# Patient Record
Sex: MALE | Race: WHITE | NOT HISPANIC OR LATINO | Employment: FULL TIME | URBAN - METROPOLITAN AREA
[De-identification: names, ages, dates, MRNs, and addresses within clinical notes are randomized per-mention and may not be internally consistent; named-entity substitution may affect disease eponyms.]

---

## 2022-03-31 ENCOUNTER — EVALUATION (OUTPATIENT)
Dept: PHYSICAL THERAPY | Facility: REHABILITATION | Age: 57
End: 2022-03-31
Payer: COMMERCIAL

## 2022-03-31 DIAGNOSIS — R26.89 IMBALANCE: ICD-10-CM

## 2022-03-31 DIAGNOSIS — I63.9 CEREBELLAR STROKE (HCC): Primary | ICD-10-CM

## 2022-03-31 PROCEDURE — 97112 NEUROMUSCULAR REEDUCATION: CPT | Performed by: PHYSICAL THERAPIST

## 2022-03-31 PROCEDURE — 97162 PT EVAL MOD COMPLEX 30 MIN: CPT | Performed by: PHYSICAL THERAPIST

## 2022-03-31 RX ORDER — ASPIRIN 325 MG
325 TABLET ORAL DAILY
COMMUNITY

## 2022-03-31 RX ORDER — ATORVASTATIN CALCIUM 10 MG/1
10 TABLET, FILM COATED ORAL DAILY
COMMUNITY

## 2022-03-31 NOTE — PROGRESS NOTES
PT Evaluation     Today's date: 3/31/2022  Patient name: Nessa Cruz  : 1965  MRN: [id number]  Referring provider: Kathryn Alfaro MD  Dx:   Encounter Diagnosis     ICD-10-CM    1  Cerebellar stroke (HCC)  I63 9    2  Imbalance  R26 89                   Assessment  Assessment details: Pt is a 61year old male referred after a cerebellar stroke  Pt presented today with impairments in decreased endurance, decreased standing balance, decreased dynamic balance all which limit him functionally with safe return to work  Pt will benefit from PT services needed to address above impairments needed to safely return to work  Impairments: abnormal gait, activity intolerance, impaired balance and lacks appropriate home exercise program  Understanding of Dx/Px/POC: good   Prognosis: good    Goals  STG  1  Pt will improve gait speed to 1 5 m/s within 2 weeks  2  Pt will improve FGA to at least 27/30 within 2 weeks  3  Pt will demonstrate independence with HEP within 2 weeks  LTGs  1  Pt will improve 6MWT to at least 1800' within 4 weeks  2  Pt will improve FGA to 30/30 within 4 weeks needed for safe return to work  3  Pt will maintain balance on noncompliant surfaces for 30s within 4 weeks needed for safe return to work  4  Pt will demonstrate independence with HEP within 4 weeks  Plan  Patient would benefit from: skilled physical therapy  Planned therapy interventions: balance, neuromuscular re-education, patient education, gait training, therapeutic exercise, therapeutic training and home exercise program  Frequency: 2x week  Duration in weeks: 4  Plan of Care beginning date: 3/31/2022  Plan of Care expiration date: 2022  Treatment plan discussed with: patient        Subjective Evaluation    History of Present Illness  Mechanism of injury: On 3/19 presented with complaints of gait ataxia, vertigo, and nausea/vomiting since Thursday  CT head revealed left cerebellar stroke     MRI demonstrates left cerebellar infarct and small right occipital stroke    Main issue now is his balance, but it is getting better  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  No home care  Needs to have good balance  Does get dizzy when rolling over in bed  Denies falls  Pain  No pain reported    Social Support  Steps to enter house: yes  Stairs in house: yes   Lives in: multiple-level home  Lives with: spouse    Employment status: working    Diagnostic Tests  MRI studies: abnormal  CT scan: abnormal  Patient Goals  Patient goals for therapy: improved balance and return to work          Objective   Neuro Exam:     Functional outcomes   6 minute walk test: 1500' without AD  Gait speed: 1 2 m/s without AD  5x sit to stand: 8 sec (seconds)  Functional outcome comment: Modified Clinical Test of Sensory Interaction on Balance  30 eyes open firm surface  30 eyes closed firm surface  30 eyes open foam surface  7 eyes closed foam surface    FGA: 24/30    VOMS (Vestibular/Ocular Motor Screen)     Smooth pursuit Normal     Saccades (horizontal) Normal     Saccades (vertical)  Normal     Convergence (near point)--Normal     VOR (horizontal) Normal     VOR (vertical) Normal     Visual Motion Sensitivity Normal    Sharp-Juan: negative  Vertebral Artery Screen: negative  Cervical AROM: WNL  DixHallpike: Right: negative; Left: negative  (*did provoke symptoms both left and right, quick down beating nystagmus noted on both sides, however transient)  Roll Test: Right negative;  Left negative       Precautions: cerebellar stroke, holter monitor      Manuals 3/31                                                                Neuro Re-Ed             BPPV/vestibular education purpose of vestibular system, explanation of BPPV, signs and symptoms of central versus peripheral x15 min            Walking with HTs, H/V Discussed for HEP            EC balance Discussed for HEP Ther Ex                                                                                                                     Ther Activity                                       Gait Training                                       Modalities

## 2022-03-31 NOTE — LETTER
2022    Elizabeth Kuhn MD  [address]  [address]    Patient: Genet Tsai   YOB: 1965   Date of Visit: 3/31/2022     Encounter Diagnosis     ICD-10-CM    1  Cerebellar stroke (HCC)  I63 9    2  Imbalance  R26 89        Dear Dr Tobias Misael:    Thank you for your recent referral of Genet Tsai  Please review the attached evaluation summary from Dominic's recent visit  Please verify that you agree with the plan of care by signing the attached order  If you have any questions or concerns, please do not hesitate to call  I sincerely appreciate the opportunity to share in the care of one of your patients and hope to have another opportunity to work with you in the near future  Sincerely,    Savannah Díaz, PT      Referring Provider:      I certify that I have read the below Plan of Care and certify the need for these services furnished under this plan of treatment while under my care  Elizabeth Kuhn MD  [address]  [place] [address]  Via Fax: [phone]          PT Evaluation     Today's date: 3/31/2022  Patient name: Genet Tsai  : 1965  MRN: [id number]  Referring provider: Mckenna Paris MD  Dx:   Encounter Diagnosis     ICD-10-CM    1  Cerebellar stroke (HCC)  I63 9    2  Imbalance  R26 89                   Assessment  Assessment details: Pt is a 61year old male referred after a cerebellar stroke  Pt presented today with impairments in decreased endurance, decreased standing balance, decreased dynamic balance all which limit him functionally with safe return to work  Pt will benefit from PT services needed to address above impairments needed to safely return to work  Impairments: abnormal gait, activity intolerance, impaired balance and lacks appropriate home exercise program  Understanding of Dx/Px/POC: good   Prognosis: good    Goals  STG  1   Pt will improve gait speed to 1 5 m/s within 2 weeks  2  Pt will improve FGA to at least 27/30 within 2 weeks  3  Pt will demonstrate independence with HEP within 2 weeks  LTGs  1  Pt will improve 6MWT to at least 1800' within 4 weeks  2  Pt will improve FGA to 30/30 within 4 weeks needed for safe return to work  3  Pt will maintain balance on noncompliant surfaces for 30s within 4 weeks needed for safe return to work  4  Pt will demonstrate independence with HEP within 4 weeks  Plan  Patient would benefit from: skilled physical therapy  Planned therapy interventions: balance, neuromuscular re-education, patient education, gait training, therapeutic exercise, therapeutic training and home exercise program  Frequency: 2x week  Duration in weeks: 4  Plan of Care beginning date: 3/31/2022  Plan of Care expiration date: 4/29/2022  Treatment plan discussed with: patient        Subjective Evaluation    History of Present Illness  Mechanism of injury: On 3/19 presented with complaints of gait ataxia, vertigo, and nausea/vomiting since Thursday  CT head revealed left cerebellar stroke  MRI demonstrates left cerebellar infarct and small right occipital stroke    Main issue now is his balance, but it is getting better  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[employer], has 4 weeks off of work  No home care  Needs to have good balance  Does get dizzy when rolling over in bed  Denies falls    Pain  No pain reported    Social Support  Steps to enter house: yes  Stairs in house: yes   Lives in: multiple-level home  Lives with: spouse    Employment status: working    Diagnostic Tests  MRI studies: abnormal  CT scan: abnormal  Patient Goals  Patient goals for therapy: improved balance and return to work          Objective   Neuro Exam:     Functional outcomes   6 minute walk test: 1500' without AD  Gait speed: 1 2 m/s without AD  5x sit to stand: 8 sec (seconds)  Functional outcome comment: Modified Clinical Test of Sensory Interaction on Balance  30 eyes open firm surface  30 eyes closed firm surface  30 eyes open foam surface  7 eyes closed foam surface    FGA: 24/30    VOMS (Vestibular/Ocular Motor Screen)     Smooth pursuit Normal     Saccades (horizontal) Normal     Saccades (vertical)  Normal     Convergence (near point)--Normal     VOR (horizontal) Normal     VOR (vertical) Normal     Visual Motion Sensitivity Normal    Sharp-Juan: negative  Vertebral Artery Screen: negative  Cervical AROM: WNL  DixHallpike: Right: negative; Left: negative  (*did provoke symptoms both left and right, quick down beating nystagmus noted on both sides, however transient)  Roll Test: Right negative;  Left negative       Precautions: cerebellar stroke, holter monitor      Manuals 3/31                                                                Neuro Re-Ed             BPPV/vestibular education purpose of vestibular system, explanation of BPPV, signs and symptoms of central versus peripheral x15 min            Walking with HTs, H/V Discussed for HEP            EC balance Discussed for HEP                                                                Ther Ex                                                                                                                     Ther Activity                                       Gait Training                                       Modalities

## 2022-04-04 ENCOUNTER — APPOINTMENT (OUTPATIENT)
Dept: PHYSICAL THERAPY | Facility: REHABILITATION | Age: 57
End: 2022-04-04
Payer: COMMERCIAL

## 2022-04-07 ENCOUNTER — OFFICE VISIT (OUTPATIENT)
Dept: PHYSICAL THERAPY | Facility: REHABILITATION | Age: 57
End: 2022-04-07
Payer: COMMERCIAL

## 2022-04-07 ENCOUNTER — EVALUATION (OUTPATIENT)
Dept: OCCUPATIONAL THERAPY | Facility: REHABILITATION | Age: 57
End: 2022-04-07
Payer: COMMERCIAL

## 2022-04-07 DIAGNOSIS — R26.89 IMBALANCE: Primary | ICD-10-CM

## 2022-04-07 DIAGNOSIS — I63.9 CEREBELLAR STROKE (HCC): ICD-10-CM

## 2022-04-07 DIAGNOSIS — I63.9 CEREBELLAR STROKE (HCC): Primary | ICD-10-CM

## 2022-04-07 PROCEDURE — 97116 GAIT TRAINING THERAPY: CPT | Performed by: PHYSICAL THERAPIST

## 2022-04-07 PROCEDURE — 97112 NEUROMUSCULAR REEDUCATION: CPT | Performed by: PHYSICAL THERAPIST

## 2022-04-07 PROCEDURE — 97530 THERAPEUTIC ACTIVITIES: CPT | Performed by: OCCUPATIONAL THERAPIST

## 2022-04-07 PROCEDURE — 97165 OT EVAL LOW COMPLEX 30 MIN: CPT | Performed by: OCCUPATIONAL THERAPIST

## 2022-04-07 NOTE — PROGRESS NOTES
Daily Note     Today's date: 2022  Patient name: Altaf Pollock  : 1965  MRN: [id number]  Referring provider: Tre Chau MD  Dx:   Encounter Diagnosis     ICD-10-CM    1  Imbalance  R26 89    2  Cerebellar stroke ([facility] [address] )  I63 9                   Subjective: Received from OT eval   Does not need OT services  Dizziness is better  Objective: See treatment diary below      Assessment: Initiated POC addressing balance deficits found on eval   Tolerated treatment well as seen by no complaints of pain and no rest periods required  Most difficulty with EC on foam and VORcx on TM due balance deficits  Patient would benefit from continued PT      Plan: Add incline to TM to challenge intensity       Precautions: cerebellar stroke, holter monitor      Manuals 3/31 4/7                                                               Neuro Re-Ed             BPPV/vestibular education purpose of vestibular system, explanation of BPPV, signs and symptoms of central versus peripheral x15 min            Walking with HTs, H/V Discussed for HEP On TM; bwd with SOLO 4x30' ea           EC balance Discussed for HEP Foam semi 4x30s; with HTs FTEC 2x30s ea           Foam beams  Tandem EC 4 laps; over high hurdles 4 laps ea fwd/side           Step-ups with holds  8"& foam fwd/side x20 ea           Slip   x5min           Tandem gait EC  SOLO 4x30'           Ther Ex                                                                                                                     Ther Activity                                       Gait Training             Treadmill  limited UE 2 5 mph x10 min with HTs H/V 2x30s ea                        Modalities

## 2022-04-07 NOTE — PROGRESS NOTES
OCCUPATIONAL THERAPY INITIAL EVALUATION:    04/07/2022  Dominic Lindsey  1965  [id number]  Cory Olivo MD   Diagnosis ICD-10-CM Associated Orders   1  Cerebellar stroke [facility])  I63 9          Assessment  Assessment details: See impairment section for further details  Skilled Analysis:  Pt is a 61 y o  male referred to Occupational Therapy s/p Cerebellar stroke ([facility] [address] ) [I63 9]  Pt participated in skilled OT evaluation and following formalized testing as well as clinical observation, Pt presents with the following areas of deficit:  Ongoing slight imbalance though Pt demo with improvement noted weekly  OTR performed full motor evaluation with results all in [facility] and performed vision screen without identification of any visual insufficiencies outside of baseline functioning  Pt with PMH of astigmatism 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, though baseline since childhood  OTR educated PT on vision screen results with all questions answered  Pt continues to demo with excellent functional recovery as a whole  OTR will not be recommending further OT services, though continuing to recommend  skilled PT services to further address slight imbalance deficits  Short Term Goals:  - No skilled OT tx recommended at this time  Long Term Goals:  - No skilled OT tx recommended at this time  Subjective Evaluation    Quality of life: good          SUBJECTIVE: "I feel pretty good  Just my balance is a little off "    PATIENT GOAL: "Balance to get better "      HISTORY OF PRESENT ILLNESS:     Pt is a 61 y o  male who was referred to Occupational Therapy s/p  [facility] [I63 9]    As per hospital reports, Pt with past medical history of hyperlipidemia who presented on 03/19/2022 with complaints of gait ataxia, vertigo, and nausea/vomiting since Thursday   CT head revealed left cerebellar stroke   CTA showed 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 with intraluminal thrombus in the distal V2, the V3 and V4 segments of the left vertebral artery  The underlying etiology is likely a dissection   MRI demonstrates left cerebellar infarct and small right occipital stroke   CT head this morning shows evolving left inferior/medial cerebellar infarct with stable mass effect on the inferior fourth ventricle   There was no hydrocephalus  MRI brain on 3/19 showed left cerebellar and vermian infarct and small infarct within the right occipital lobe with significant mass effect on the fourth ventricle but no hemorrhage and decreased flow signal in the distal left vertebral artery  Serial CTH were stable, checked daily for three days  Patient was approved for outpatient rehab and home with assist per PT  Cardiology was consulted due to consistent bradycardia, which patient reports has always been something he has had  Cardiology does not believe this is pathologic but recommended avoidance of AV blocking medications and cardiac monitoring as outpatient for Atrial Fibrillation  Pt was D/Jaswinder to home environment on 2022 with further recommendations of PT/OT services  Pt has f/u appt with cardiologist and neurologist scheduled  Pt with self-report of continuing to notice slight delayed accommodation with self-report of noticing increased time to focus when performing near/far functional saccadic movements  Pt reports continuing to notice slight decreased balance  Pt lives in a two story home wife  Pt currently performing all ADLs/IADLs at independent status without difficulties reported  Pt loss of worker role until potentially 2022, but will be further discussed/assessed  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  Pt continue the  role without difficulties reported        PMH:   Past Medical History:   Diagnosis Date    Hyperlipidemia          Pain Levels:     Restin    With Activity:  0    Objective     Functional Assessment        Comments  See impairment section for further details  Impairment Observations:        IE RUE IE LUE  Comments                 UPPER EXTREMITY FXN    Pt is L hand dominant                           /Pinch Strength           Dynamometer       - Gross Grasp 140 lbs 145 lbs  within normal limits   Pinch Meter        - PINCER 19 lbs 21 lbs  within normal limits    - TRIPOD 36 lbs 36 5 lbs  within normal limits    - LATERAL 33 5 lbs  30 lbs  within normal limits               AROM (seated)        [facility]     [facility]  WFL      Shoulder Ext [facility]  WFL      Shoulder Abd [facility]  WFL      Shoulder Add WFL  WFL      Horizontal Abd WFL  WFL      Horizontal Add WFL  WFL      Elbow Flex WFL  WFL      Elbow Ext WFL  WFL      Pronation WFL  WFL      Supination [facility]  WFL      Wrist Flex WFL  WFL      Wrist Ext [facility]  WFL      Digit Flex [facility]  WFL      Digit Ex [facility]  WFL      Composite Grasp WFL  WFL      Hook Grasp [facility]  WFL      Opposition Intact Intact      Dysdiadochokinesia Intact  Intact     MMT:       Shoulder FF 5/5 5/5     Shoulder Ext 5/5 5/5     Shoulder Abd 5/5 5/5     Shoulder ADd 5/5 5/5     Elbow Flex 5/5 5/5     Elbow Ext 5/5 5/5     Wrist Flex 5/5 5/5     Wrist Ext 5/5 5/5     SENSATION       Myofilaments (2 83Wnl) 2 83 2 83  Loss of protective sensation: 4 56    Complete loss of sensation / deep pressure: 6 65   Sharp Dull  Intact Intact     Proprioception Intact Intact     Hot/Cold Temp Intact Intact            COORDINATION       9 Hole Peg Test 23 66 seconds 20 04 seconds  within normal limits   Fxnl Dexterity Test 25 53 seconds 24 47 seconds  within normal limits     Vision Screen    Pt with PMH of astigmatismstigmatismin B/L eyes;  Pt with prescribed glasses (no-line bifocals)    Visual acuity R 20/20, L 20/50    Binocularity, far orthotropia/orthophoria    Binocularity, near orthotropia/orthophoria    Accommodation 2"    Convergence Decreased teaming in L eye, Pt with past history of L eye insufficiencies (astigmatism)    Ruddy string misalignment    Pursuits Smooth in all planes    Saccades Initially slight dysmetria in vertical plane, though resolved with massed practice    ROM Full AROM    Visual perceptual midline test Intact        OTHER PLANNED THERAPY INTERVENTIONS:   No skilled OT tx recommended at this time        INTERVENTION COMMENTS:  Diagnosis: Cerebellar stroke [facility]) [I63 9]  Precautions: Fall risk  FOTO: not applicable  Insurance: Payor: [employer] / Plan: [employer] / Product Type: PPO Commercial /

## 2022-04-07 NOTE — LETTER
April 7, 2022    Travon Cruz MD  [address]  [facility]  [address] [facility]    Patient: Sheldon Gilbert   YOB: 1965   Date of Visit: 4/7/2022     Encounter Diagnosis     ICD-10-CM    1  Cerebellar stroke [facility])  I63 9        Dear Dr Quezada Range:    Thank you for your recent referral of Sheldon Gilbert  Please review the attached evaluation summary from Dominic's recent visit  Please verify that you agree with the plan of care by signing the attached order  If you have any questions or concerns, please do not hesitate to call  I sincerely appreciate the opportunity to share in the care of one of your patients and hope to have another opportunity to work with you in the near future  Sincerely,    Alysa Dubon, OT      Referring Provider:     I certify that I have read the below Plan of Care and certify the need for these services furnished under this plan of treatment while under my care  Travon Cruz MD  [address]  [place] [address]  Via Fax: [phone]          OCCUPATIONAL THERAPY INITIAL EVALUATION:    04/07/2022  Maurilioangeline Higginsn  1965  [id number]  Esthela Posadas MD   Diagnosis ICD-10-CM Associated Orders   1  Cerebellar stroke [facility])  I63 9          Assessment  Assessment details: See impairment section for further details  Skilled Analysis:  Pt is a 61 y o  male referred to Occupational Therapy s/p Cerebellar stroke ([facility] [address] ) [I63 9]  Pt participated in skilled OT evaluation and following formalized testing as well as clinical observation, Pt presents with the following areas of deficit:  Ongoing slight imbalance though Pt demo with improvement noted weekly  OTR performed full motor evaluation with results all in [facility] and performed vision screen without identification of any visual insufficiencies outside of baseline functioning    Pt with PMH of astigmatism 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, though baseline since childhood  OTR educated PT on vision screen results with all questions answered  Pt continues to demo with excellent functional recovery as a whole  OTR will not be recommending further OT services, though continuing to recommend  skilled PT services to further address slight imbalance deficits  Short Term Goals:  - No skilled OT tx recommended at this time  Long Term Goals:  - No skilled OT tx recommended at this time  Subjective Evaluation    Quality of life: good          SUBJECTIVE: "I feel pretty good  Just my balance is a little off "    PATIENT GOAL: "Balance to get better "      HISTORY OF PRESENT ILLNESS:     Pt is a 61 y o  male who was referred to Occupational Therapy s/p  [facility] [I63 9]  As per hospital reports, Pt with past medical history of hyperlipidemia who presented on 03/19/2022 with complaints of gait ataxia, vertigo, and nausea/vomiting since Thursday   CT head revealed left cerebellar stroke   CTA showed 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 with intraluminal thrombus in the distal V2, the V3 and V4 segments of the left vertebral artery  The underlying etiology is likely a dissection   MRI demonstrates left cerebellar infarct and small right occipital stroke   CT head this morning shows evolving left inferior/medial cerebellar infarct with stable mass effect on the inferior fourth ventricle   There was no hydrocephalus  MRI brain on 3/19 showed left cerebellar and vermian infarct and small infarct within the right occipital lobe with significant mass effect on the fourth ventricle but no hemorrhage and decreased flow signal in the distal left vertebral artery  Serial CTH were stable, checked daily for three days  Patient was approved for outpatient rehab and home with assist per PT  Cardiology was consulted due to consistent bradycardia, which patient reports has always been something he has had   Cardiology does not believe this is pathologic but recommended avoidance of AV blocking medications and cardiac monitoring as outpatient for Atrial Fibrillation  Pt was D/Jaswinder to home environment on 2022 with further recommendations of PT/OT services  Pt has f/u appt with cardiologist and neurologist scheduled  Pt with self-report of continuing to notice slight delayed accommodation with self-report of noticing increased time to focus when performing near/far functional saccadic movements  Pt reports continuing to notice slight decreased balance  Pt lives in a two story home wife  Pt currently performing all ADLs/IADLs at independent status without difficulties reported  Pt loss of worker role until potentially 2022, but will be further discussed/assessed  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 works 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/week  Pt continue the  role without difficulties reported  PMH:   Past Medical History:   Diagnosis Date    Hyperlipidemia          Pain Levels:     Restin    With Activity:  0    Objective     Functional Assessment        Comments  See impairment section for further details         Impairment Observations:        IE RUE IE LUE  Comments                 UPPER EXTREMITY FXN    Pt is L hand dominant                           /Pinch Strength           Dynamometer       - Gross Grasp 140 lbs 145 lbs  within normal limits   Pinch Meter        - PINCER 19 lbs 21 lbs  within normal limits    - TRIPOD 36 lbs 36 5 lbs  within normal limits    - LATERAL 33 5 lbs  30 lbs  within normal limits               AROM (seated)        Elevation [facility]     Shoulder FF [facility]  WFL      Shoulder Ext [facility]  WFL      Shoulder Abd [facility]  WFL      Shoulder Add WFL  WFL      Horizontal Abd WFL  WFL      Horizontal Add WFL  WFL      Elbow Flex WFL  WFL      Elbow Ext WFL  WFL      Pronation WFL  WFL      Supination WFL  WFL      Wrist Flex [facility]  WFL      [facility]rist Ext [facility]  WFL      Digit Flex [facility]  WFL      Digit Ex [facility]  WFL      Composite Grasp WFL  WFL      Hook Grasp [facility]  WFL      Opposition Intact Intact      Dysdiadochokinesia Intact  Intact     MMT:       Shoulder FF 5/5 5/5     Shoulder Ext 5/5 5/5     Shoulder Abd 5/5 5/5     Shoulder ADd 5/5 5/5     Elbow Flex 5/5 5/5     Elbow Ext 5/5 5/5     Wrist Flex 5/5 5/5     Wrist Ext 5/5 5/5     SENSATION       Myofilaments (2 83Wnl) 2 83 2 83  Loss of protective sensation: 4 56    Complete loss of sensation / deep pressure: 6 65   Sharp Dull  Intact Intact     Proprioception Intact Intact     Hot/Cold Temp Intact Intact            COORDINATION       9 Hole Peg Test 23 66 seconds 20 04 seconds  within normal limits   Fxnl Dexterity Test 25 53 seconds 24 47 seconds  within normal limits     Vision Screen    Pt with PMH of astigmatismstigmatismin B/L eyes; Pt with prescribed glasses (no-line bifocals)    Visual acuity R 20/20, L 20/50    Binocularity, far orthotropia/orthophoria    Binocularity, near orthotropia/orthophoria    Accommodation 2"    Convergence Decreased teaming in L eye, Pt with past history of L eye insufficiencies (astigmatism)    Ruddy string misalignment    Pursuits Smooth in all planes    Saccades Initially slight dysmetria in vertical plane, though resolved with massed practice    ROM Full AROM    Visual perceptual midline test Intact        OTHER PLANNED THERAPY INTERVENTIONS:   No skilled OT tx recommended at this time        INTERVENTION COMMENTS:  Diagnosis: Cerebellar stroke [facility]) [I63 9]  Precautions: Fall risk  FOTO: not applicable  Insurance: Payor: [employer] / Plan: [employer] / Product Type: PPO Commercial /

## 2022-04-12 ENCOUNTER — OFFICE VISIT (OUTPATIENT)
Dept: PHYSICAL THERAPY | Facility: REHABILITATION | Age: 57
End: 2022-04-12
Payer: COMMERCIAL

## 2022-04-12 DIAGNOSIS — I63.9 CEREBELLAR STROKE (HCC): Primary | ICD-10-CM

## 2022-04-12 DIAGNOSIS — R26.89 IMBALANCE: ICD-10-CM

## 2022-04-12 PROCEDURE — 97116 GAIT TRAINING THERAPY: CPT | Performed by: PHYSICAL THERAPIST

## 2022-04-12 PROCEDURE — 97112 NEUROMUSCULAR REEDUCATION: CPT | Performed by: PHYSICAL THERAPIST

## 2022-04-12 NOTE — PROGRESS NOTES
Daily Note     Today's date: 2022  Patient name: Tiffanie Lloyd  : 1965  MRN: [id number]  Referring provider: Марина Costello MD  Dx:   Encounter Diagnosis     ICD-10-CM    1  Cerebellar stroke (HCC)  I63 9    2  Imbalance  R26 89                   Subjective: No new complaints  Felt fine after last session, no increased symptoms post       Objective: See treatment diary below      Assessment:  Added more difficult exercises on TM today with SOLO for fall prevention  Had multiple LOBs with HTs and VORcx requiring SOLO and UE support to correct  Continues with good tolerance as seen by no rest periods needed and no increases in dizziness  Patient would benefit from continued PT      Plan: Add incline to TM to challenge intensity with fwd walking       Precautions: cerebellar stroke, holter monitor      Manuals 3/31 4/7 4/12                                                              Neuro Re-Ed             BPPV/vestibular education purpose of vestibular system, explanation of BPPV, signs and symptoms of central versus peripheral x15 min            Walking with HTs, H/V Discussed for HEP On TM; bwd with SOLO 4x30' ea On TM          EC balance Discussed for HEP Foam semi 4x30s; with HTs FTEC 2x30s ea Foam semi 4x30s; with HTs semiEC 2x30s ea          Foam beams  Tandem EC 4 laps; over high hurdles 4 laps ea fwd/side Tandem EC 4 laps; over high hurdles 4 laps ea fwd/side          Step-ups with holds  8"& foam fwd/side x20 ea 8"& foam fwd/side x20 ea          Slip   x5min x5min          Tandem gait EC  SOLO 4x30' SOLO 4x30'          Ther Ex                                                                                                                     Ther Activity                                       Gait Training             Treadmill  limited UE 2 5 mph x10 min with HTs H/V 2x30s ea SOLO fwd x13min 3 mph; HTs H/V 2x30s ea, VORcx 2x30s ea, 15% incline x3min; bwd 1 5 mph x5min; downhill 15% 2 5 mph x3min HTs x30s ea                       Modalities

## 2022-04-15 ENCOUNTER — APPOINTMENT (OUTPATIENT)
Dept: PHYSICAL THERAPY | Facility: REHABILITATION | Age: 57
End: 2022-04-15
Payer: COMMERCIAL

## 2022-04-19 ENCOUNTER — OFFICE VISIT (OUTPATIENT)
Dept: PHYSICAL THERAPY | Facility: REHABILITATION | Age: 57
End: 2022-04-19
Payer: COMMERCIAL

## 2022-04-19 DIAGNOSIS — I63.9 CEREBELLAR STROKE (HCC): Primary | ICD-10-CM

## 2022-04-19 DIAGNOSIS — R26.89 IMBALANCE: ICD-10-CM

## 2022-04-19 PROCEDURE — 97112 NEUROMUSCULAR REEDUCATION: CPT | Performed by: PHYSICAL THERAPIST

## 2022-04-19 PROCEDURE — 97150 GROUP THERAPEUTIC PROCEDURES: CPT | Performed by: PHYSICAL THERAPIST

## 2022-04-19 NOTE — PROGRESS NOTES
Daily Note     Today's date: 2022  Patient name: Rashida Mcneil  : 1965  MRN: [id number]  Referring provider: Arron Roman MD  Dx:   Encounter Diagnosis     ICD-10-CM    1  Cerebellar stroke (HCC)  I63 9    2  Imbalance  R26 89                   Subjective: Has been practicing more at home  Has difficulty on grass with eyes closed  Objective: See treatment diary below  [id number] 1:1  8339-3951 self-directed  [phone] group  9498-2821 1:1    Assessment:  Improved control with step-ups and hurdles today showing progress with coordination  Continues with difficulty with EC on foam as seen by multiple LOBs  Added foam incline with good tolerance as seen by no significant LOBs  Improved control and stepping strategies noted on slip  as well today showing progress  Patient would benefit from continued PT      Plan: Faster speeds on TM NV       Precautions: cerebellar stroke, holter monitor      Manuals 3/31 4/7 4/12 4/19                                                             Neuro Re-Ed             BPPV/vestibular education purpose of vestibular system, explanation of BPPV, signs and symptoms of central versus peripheral x15 min            Walking with HTs, H/V Discussed for HEP On TM; bwd with SOLO 4x30' ea On TM On TM         EC balance Discussed for HEP Foam semi 4x30s; with HTs FTEC 2x30s ea Foam semi 4x30s; with HTs semiEC 2x30s ea Foam semi 4x30s; with HTs semiEC 2x30s ea         Foam beams  Tandem EC 4 laps; over high hurdles 4 laps ea fwd/side Tandem EC 4 laps; over high hurdles 4 laps ea fwd/side Tandem EC 4 laps; over high hurdles 4 laps ea fwd/side         Step-ups with holds  8"& foam fwd/side x20 ea 8"& foam fwd/side x20 ea 8"& foam fwd/side x20 ea         Slip   x5min x5min x5min         Foam incline    x5 ea directions SOLO         Tandem gait EC  SOLO 4x30' SOLO 4x30' SOLO 4x30'         Ther Ex Ther Activity                                       Gait Training             Treadmill  limited UE 2 5 mph x10 min with HTs H/V 2x30s ea SOLO fwd x13min 3 mph; HTs H/V 2x30s ea, VORcx 2x30s ea, 15% incline x3min; bwd 1 5 mph x5min; downhill 15% 2 5 mph x3min HTs x30s ea SOLO fwd x10min 3 mph; HTs H/V 2x30s ea, VORcx 2x30s ea                      Modalities

## 2022-04-22 ENCOUNTER — OFFICE VISIT (OUTPATIENT)
Dept: PHYSICAL THERAPY | Facility: REHABILITATION | Age: 57
End: 2022-04-22
Payer: COMMERCIAL

## 2022-04-22 DIAGNOSIS — R26.89 IMBALANCE: ICD-10-CM

## 2022-04-22 DIAGNOSIS — I63.9 CEREBELLAR STROKE (HCC): Primary | ICD-10-CM

## 2022-04-22 PROCEDURE — 97112 NEUROMUSCULAR REEDUCATION: CPT | Performed by: PHYSICAL THERAPIST

## 2022-04-22 PROCEDURE — 97150 GROUP THERAPEUTIC PROCEDURES: CPT | Performed by: PHYSICAL THERAPIST

## 2022-04-22 NOTE — PROGRESS NOTES
Daily Note     Today's date: 2022  Patient name: William Echevarria  : 1965  MRN: [id number]  Referring provider: Darío Beth MD  Dx:   Encounter Diagnosis     ICD-10-CM    1  Cerebellar stroke (HCC)  I63 9    2  Imbalance  R26 89                   Subjective: No new complaints  Feels like he's pretty good  Returns to work next Thursday  Objective: See treatment diary below    [id number] 1:1  3403-0801 group  7524-1241 self-directed    Assessment:   Improved balance noted today with pt demonstrating less LOBs  Able to self correct with stepping strategies as needed  No complaints of dizziness  Continues with difficulty with EC activities as seen by LOBs and sway  Patient would benefit from continued PT      Plan: Progress update with potential 30 day hold NV       Precautions: cerebellar stroke, holter monitor      Manuals 3/31 4/7 4/12 4/19 4/22                                                            Neuro Re-Ed             BPPV/vestibular education purpose of vestibular system, explanation of BPPV, signs and symptoms of central versus peripheral x15 min            Walking with HTs, H/V Discussed for HEP On TM; bwd with SOLO 4x30' ea On TM On TM On TM        EC balance Discussed for HEP Foam semi 4x30s; with HTs FTEC 2x30s ea Foam semi 4x30s; with HTs semiEC 2x30s ea Foam semi 4x30s; with HTs semiEC 2x30s ea Foam semi 4x30s    with HTs semiEC 2x30s ea        Foam beams  Tandem EC 4 laps; over high hurdles 4 laps ea fwd/side Tandem EC 4 laps; over high hurdles 4 laps ea fwd/side Tandem EC 4 laps; over high hurdles 4 laps ea fwd/side Tandem EC 4 laps; side with HTs EC 2 laps ea        Step-ups with holds  8"& foam fwd/side x20 ea 8"& foam fwd/side x20 ea 8"& foam fwd/side x20 ea 8"& foam fwd/side x20 ea        Slip   x5min x5min x5min x5min        Foam incline    x5 ea directions SOLO         Tandem gait EC  SOLO 4x30' SOLO 4x30' SOLO 4x30' SOLO 4x30'        Ther Ex Ther Activity                                       Gait Training             Treadmill  limited UE 2 5 mph x10 min with HTs H/V 2x30s ea SOLO fwd x13min 3 mph; HTs H/V 2x30s ea, VORcx 2x30s ea, 15% incline x3min; bwd 1 5 mph x5min; downhill 15% 2 5 mph x3min HTs x30s ea SOLO fwd x10min 3 mph; HTs H/V 2x30s ea, VORcx 2x30s ea SOLO fwd x10min 3 mph; HTs H/V 2x30s ea, VORcx 2x30s ea    downhill 15% 2 5 mph HTs H/V 2x30s ea VORcx H/V 2x30s ea                     Modalities

## 2022-04-25 ENCOUNTER — EVALUATION (OUTPATIENT)
Dept: PHYSICAL THERAPY | Facility: REHABILITATION | Age: 57
End: 2022-04-25
Payer: COMMERCIAL

## 2022-04-25 DIAGNOSIS — R26.89 IMBALANCE: ICD-10-CM

## 2022-04-25 DIAGNOSIS — I63.9 CEREBELLAR STROKE (HCC): Primary | ICD-10-CM

## 2022-04-25 PROCEDURE — 97112 NEUROMUSCULAR REEDUCATION: CPT | Performed by: PHYSICAL THERAPIST

## 2022-04-25 PROCEDURE — 97116 GAIT TRAINING THERAPY: CPT | Performed by: PHYSICAL THERAPIST

## 2022-04-25 NOTE — PROGRESS NOTES
Daily Note     Today's date: 2022  Patient name: Kenzie Baird  : 1965  MRN: [id number]  Referring provider: Martell Yang MD  Dx:   Encounter Diagnosis     ICD-10-CM    1  Cerebellar stroke (HCC)  I63 9    2  Imbalance  R26 89                   Subjective: Confirmed discharge today  Returning to work on [employer]  Only limitation is some dizziness with lying back in bed  Otherwise has returned to his normal   Did a lot of yardwork this weekend without concern  Objective: See treatment diary below    Functional outcomes   6 minute walk test: 1500' without AD on eval; 2300' without AD today  Gait speed: 1 2 m/s without AD on eval; 1 5 m/s without AD today  5x sit to stand: 8 sec (seconds)  Functional outcome comment: Modified Clinical Test of Sensory Interaction on Balance  30 eyes open firm surface  30 eyes closed firm surface  30 eyes open foam surface  7 eyes closed foam surface on eval; 30s today     FGA:  on eval;  today    DixHallpike: Right: negative; Left: positive (initially downbeating torsional on first test, second test upbeating torsional)  Roll Test: Right negative; Left negative    Assessment:  Outcomes assessed above with pt making significant progress  Able to meet all goals at this time  Not a fall risk  Did have a positive torsional nystagmus (first test downbeating, second test upbeating) and increased symptoms of vertigo with left [employer] indicating potential L BPPV  CRM completed as below with complete resolution of symptoms and resolution  Will follow-up with PT as needed  Plan: 30 day hold       Precautions: cerebellar stroke, holter monitor      Manuals 3/31 4/7 4/12 4/19 4/22 4/25       L CRM      Epley x2; Yissel x2                                              Neuro Re-Ed             BPPV/vestibular education purpose of vestibular system, explanation of BPPV, signs and symptoms of central versus peripheral x15 min            Walking with HTs, H/V Discussed for HEP On TM; bwd with SOLO 4x30' ea On TM On TM On TM        EC balance Discussed for HEP Foam semi 4x30s; with HTs FTEC 2x30s ea Foam semi 4x30s; with HTs semiEC 2x30s ea Foam semi 4x30s; with HTs semiEC 2x30s ea Foam semi 4x30s    with HTs semiEC 2x30s ea        Foam beams  Tandem EC 4 laps; over high hurdles 4 laps ea fwd/side Tandem EC 4 laps; over high hurdles 4 laps ea fwd/side Tandem EC 4 laps; over high hurdles 4 laps ea fwd/side Tandem EC 4 laps; side with HTs EC 2 laps ea        Step-ups with holds  8"& foam fwd/side x20 ea 8"& foam fwd/side x20 ea 8"& foam fwd/side x20 ea 8"& foam fwd/side x20 ea        Slip   x5min x5min x5min x5min        Foam incline    x5 ea directions SOLO         Tandem gait EC  SOLO 4x30' SOLO 4x30' SOLO 4x30' SOLO 4x30'        Ther Ex                                                                                                                     Ther Activity                                       Gait Training             Treadmill  limited UE 2 5 mph x10 min with HTs H/V 2x30s ea SOLO fwd x13min 3 mph; HTs H/V 2x30s ea, VORcx 2x30s ea, 15% incline x3min; bwd 1 5 mph x5min; downhill 15% 2 5 mph x3min HTs x30s ea SOLO fwd x10min 3 mph; HTs H/V 2x30s ea, VORcx 2x30s ea SOLO fwd x10min 3 mph; HTs H/V 2x30s ea, VORcx 2x30s ea    downhill 15% 2 5 mph HTs H/V 2x30s ea VORcx H/V 2x30s ea                     Modalities

## 2022-04-28 ENCOUNTER — APPOINTMENT (OUTPATIENT)
Dept: PHYSICAL THERAPY | Facility: REHABILITATION | Age: 57
End: 2022-04-28
Payer: COMMERCIAL